# Patient Record
Sex: FEMALE | URBAN - METROPOLITAN AREA
[De-identification: names, ages, dates, MRNs, and addresses within clinical notes are randomized per-mention and may not be internally consistent; named-entity substitution may affect disease eponyms.]

---

## 2019-12-20 ENCOUNTER — APPOINTMENT (RX ONLY)
Dept: URBAN - METROPOLITAN AREA CLINIC 77 | Facility: CLINIC | Age: 44
Setting detail: DERMATOLOGY
End: 2019-12-20

## 2019-12-20 DIAGNOSIS — L98.8 OTHER SPECIFIED DISORDERS OF THE SKIN AND SUBCUTANEOUS TISSUE: ICD-10-CM

## 2019-12-20 PROBLEM — E86.9 VOLUME DEPLETION, UNSPECIFIED: Status: ACTIVE | Noted: 2019-12-20

## 2019-12-20 PROCEDURE — ? JUVEDERM ULTRA XC INJECTION

## 2019-12-20 PROCEDURE — ? SCULPTRA

## 2019-12-20 PROCEDURE — ? ADDITIONAL NOTES

## 2019-12-20 NOTE — PROCEDURE: SCULPTRA
Right Zygomatic Arche Filler Volume In Cc: 0
Show Right And Left Jawline Volume?: No
Show Forehead Volume?: Yes
Dilution Method: The Sculptra was diluted with 6ml of bacteriostatic sterile water and 3.0 mL of 1% Lidocaine with epinephrine 1:100,000 for a total volume of 9mL for each vial.
Expiration Date (Month Year): 10/31/2021, 06/30/22
Lot #: [id number] 
Injection Technique: The Sculptra was injected to the listed areas after cleansing the skin and providing appropriate anesthesia.
Additional Area 1 Volume In Cc: 9
Post-Care Instructions: Patient instructed to apply ice to reduce swelling.
Detail Level: Detailed
Price (Use Numbers Only, No Special Characters Or $): 1200.00
Additional Area 1 Location: right medial I/O, lateral I/O bilaterally, zygomaticomalar cheeks, submalar cheeks, MLFs, pillars
Reconstitution Date: 12/10/19
Topical Anesthesia?: 20% lidocaine
Consent: Written consent obtained. Risks include but not limited to bruising, beading, irregular texture, ulceration, infection, allergic reaction, scar formation, incomplete augmentation, temporary nature, procedural pain.
Map Statement: See Attached Map for Complete Details.

## 2019-12-20 NOTE — PROCEDURE: JUVEDERM ULTRA XC INJECTION
Additional Area 1 Location: vermillion body of central 2/3rd of upper lip bilaterally, central lower lip vermilion body, lower MLFs

## 2019-12-20 NOTE — PROCEDURE: ADDITIONAL NOTES
Detail Level: Detailed
Additional Notes: The patient’s right lower lip is naturally taveras than the left lower lip.
Additional Notes: The patient has a residual knot after a previous Sculptra treatment over the Right lateral I/O bone. It was elected to not proceed with Botox today since a Sculptra treatment was performed.

## 2019-12-20 NOTE — PROCEDURE: JUVEDERM ULTRA XC INJECTION
Post-Care Instructions: Patient instructed to apply ice to reduce swelling. Discussed signs and symptoms of vascular compromise. Patient was instructed to call the office immediately.

## 2019-12-23 ENCOUNTER — APPOINTMENT (RX ONLY)
Dept: URBAN - METROPOLITAN AREA CLINIC 77 | Facility: CLINIC | Age: 44
Setting detail: DERMATOLOGY
End: 2019-12-23

## 2019-12-23 DIAGNOSIS — D69.2 OTHER NONTHROMBOCYTOPENIC PURPURA: ICD-10-CM

## 2019-12-23 PROCEDURE — ? EXCEL V LASER

## 2019-12-23 ASSESSMENT — LOCATION ZONE DERM: LOCATION ZONE: FACE

## 2019-12-23 ASSESSMENT — LOCATION DETAILED DESCRIPTION DERM: LOCATION DETAILED: LEFT INFERIOR MEDIAL MALAR CHEEK

## 2019-12-23 ASSESSMENT — LOCATION SIMPLE DESCRIPTION DERM: LOCATION SIMPLE: LEFT CHEEK

## 2019-12-23 NOTE — PROCEDURE: EXCEL V LASER
Detail Level: Zone
Price (Use Numbers Only, No Special Characters Or $): 0
Pulse Width In Msec: 8
External Cooling: Tye Cryo 5
Procedural Text: Cold gel was applied to the treatment areas.  The treatment areas were treated as noted above.
Fluence In J: 6
Pre-Procedure Text: No numb time.
External Cooling Fan Speed: 5
Spot Size: 12
Consent: Written consent obtained, risks reviewed including but not limited to crusting, scabbing, blistering, scarring, darker or lighter pigmentary change, and/or incomplete removal.
Treatment Number (Will Not Render If 0): 1
Post-Procedure Text: Post-op instructions discussed with patient.
Laser Type: 1064nm

## 2020-01-31 ENCOUNTER — APPOINTMENT (RX ONLY)
Dept: URBAN - METROPOLITAN AREA CLINIC 77 | Facility: CLINIC | Age: 45
Setting detail: DERMATOLOGY
End: 2020-01-31

## 2020-01-31 DIAGNOSIS — L98.8 OTHER SPECIFIED DISORDERS OF THE SKIN AND SUBCUTANEOUS TISSUE: ICD-10-CM

## 2020-01-31 PROCEDURE — ? COUNSELING

## 2020-01-31 PROCEDURE — ? BOTOX

## 2020-01-31 NOTE — PROCEDURE: BOTOX
Show Lcl Units: No
Show Additional Area 3: Yes
[facility] Units: 0
Consent: Written consent obtained. Risks include but not limited to lid/brow ptosis, bruising, swelling, diplopia, temporary effect, incomplete chemical denervation.
Additional Area 3 Units: 8
Additional Area 2 Location: upper lip
Additional Area 2 Units: 4.5
Additional Area 1 Units: 6
Detail Level: Detailed
Periorbital Skin Units: 10
Forehead Units: 1.5
Additional Area 1 Location: tails
Lot #: [id number]
Post-Care Instructions: Patient instructed to not rub or massage areas injected, not to lie down for 4 hours and to limit physical activity for 24 hours. Patient instructed not to travel by airplane for 48 hours. Patient instructed to animate the muscle groups that were injected today every 5-6 minutes for the next 3-4 hours.
Price (Use Numbers Only, No Special Characters Or $): 957
Additional Area 3 Location: lateral corrugators
Glabellar Complex Units: 9
Expiration Date (Month Year): 08/2022

## 2020-03-17 ENCOUNTER — RX ONLY (OUTPATIENT)
Age: 45
Setting detail: RX ONLY
End: 2020-03-17

## 2020-03-17 ENCOUNTER — APPOINTMENT (RX ONLY)
Dept: URBAN - METROPOLITAN AREA CLINIC 77 | Facility: CLINIC | Age: 45
Setting detail: DERMATOLOGY
End: 2020-03-17

## 2020-03-17 PROBLEM — E86.9 VOLUME DEPLETION, UNSPECIFIED: Status: ACTIVE | Noted: 2020-03-17

## 2020-03-17 PROCEDURE — ? SCULPTRA

## 2020-03-17 NOTE — PROCEDURE: SCULPTRA
Consent: Written consent obtained. Risks include but not limited to bruising, beading, irregular texture, ulceration, infection, allergic reaction, scar formation, incomplete augmentation, temporary nature, procedural pain.
Right Lateral Face Filler Volume In Cc: 0
Show Right And Left Pa Volume?: No
Use Map Statement For Sites (Optional): Yes
Additional Area 1 Volume In Cc: 9
Additional Area 1 Location: lateral IO cheeks, zygomatica malar cheeks, sub malar cheeks, pyriform triangles, right pillar
Post-Care Instructions: Patient instructed to apply ice to reduce swelling.
Dilution Method: The Sculptra was diluted with 6ml of bacteriostatic sterile water and 3.0 mL of 1% Lidocaine with epinephrine for a total volume of 9mL for each vial.
Detail Level: Detailed
Price (Use Numbers Only, No Special Characters Or $): 1200
Vials Reconstituted (Required For Inventory): 1
Volumizer: Sculptra
Reconstitution Date: 3-17-20
Lot #: [id number]
Expiration Date (Month Year): 10/31/2022
Injection Technique: The Sculptra was injected to the listed areas after cleansing the skin and providing appropriate anesthesia.
Treatment Number: 2
Map Statement: See Attached Map for Complete Details.

## 2020-04-08 ENCOUNTER — APPOINTMENT (RX ONLY)
Dept: URBAN - METROPOLITAN AREA CLINIC 77 | Facility: CLINIC | Age: 45
Setting detail: DERMATOLOGY
End: 2020-04-08

## 2020-04-08 DIAGNOSIS — L50.8 OTHER URTICARIA: ICD-10-CM

## 2020-04-08 PROBLEM — L50.1 IDIOPATHIC URTICARIA: Status: ACTIVE | Noted: 2020-04-08

## 2020-04-08 PROCEDURE — ? COUNSELING

## 2020-04-08 PROCEDURE — 99213 OFFICE O/P EST LOW 20 MIN: CPT

## 2020-04-08 PROCEDURE — ? PRESCRIPTION

## 2020-04-08 PROCEDURE — ? TREATMENT REGIMEN

## 2020-04-08 ASSESSMENT — LOCATION DETAILED DESCRIPTION DERM
LOCATION DETAILED: UPPER STERNUM
LOCATION DETAILED: LEFT ANTERIOR PROXIMAL UPPER ARM
LOCATION DETAILED: RIGHT ANTERIOR DISTAL UPPER ARM

## 2020-04-08 ASSESSMENT — LOCATION SIMPLE DESCRIPTION DERM
LOCATION SIMPLE: RIGHT UPPER ARM
LOCATION SIMPLE: CHEST
LOCATION SIMPLE: LEFT UPPER ARM

## 2020-04-08 ASSESSMENT — LOCATION ZONE DERM
LOCATION ZONE: TRUNK
LOCATION ZONE: ARM

## 2020-04-08 NOTE — PROCEDURE: TREATMENT REGIMEN
Detail Level: Zone
Initiate Treatment: Zyrtec once daily May increase to twice daily. Prednisone taper.
Continue Regimen: Benadryl at night.

## 2020-04-08 NOTE — HPI: HIVES (URTICARIA)
Is This A New Presentation, Or A Follow-Up?: Hives
Additional History: Patient have not started any new medication or personal products. She denies of shortness of breath no cough, itchy eyes or runny nose. She is using clobetasol cream to affected area and cold compresses.

## 2020-06-02 ENCOUNTER — APPOINTMENT (RX ONLY)
Dept: URBAN - METROPOLITAN AREA CLINIC 77 | Facility: CLINIC | Age: 45
Setting detail: DERMATOLOGY
End: 2020-06-02

## 2020-06-02 PROBLEM — E86.9 VOLUME DEPLETION, UNSPECIFIED: Status: ACTIVE | Noted: 2020-06-02

## 2020-06-02 PROCEDURE — ? SCULPTRA

## 2020-06-02 NOTE — PROCEDURE: SCULPTRA
Right Jawline Filler Volume In Cc: 0
Post-Care Instructions: Patient instructed to apply ice to reduce swelling.
Map Statement: See Attached Map for Complete Details.
Show Right And Left Middle Malar Volume?: No
Show Chin Volume?: Yes
Topical Anesthesia?: 20% lidocaine
Detail Level: Detailed
Reconstitution Date: 5-26-20 & 6-1-20
Additional Area 1 Volume In Cc: 6
Price (Use Numbers Only, No Special Characters Or $): 900
Dilution Method: The Sculptra was diluted with 6ml of bacteriostatic sterile water and 3.0 mL of 1% Lidocaine with epinephrine for a total volume of 9mL for each vial.
Treatment Number: 3
Lot #: [id number]
Injection Technique: The Sculptra was injected to the listed areas after cleansing the skin and providing appropriate anesthesia.
Vials Reconstituted (Required For Inventory): 1
Additional Area 1 Location: right mlf, right lateral infraorbital rim, left zygomatic cheek/ zm arch, left submalar cheek, left upper mlf, left io rim
Expiration Date (Month Year): 8/31/22 & 10/31/2022
Consent: Written consent obtained. Risks include but not limited to bruising, beading, irregular texture, ulceration, infection, allergic reaction, scar formation, incomplete augmentation, temporary nature, procedural pain.
Volumizer: Sculptra

## 2020-06-30 ENCOUNTER — APPOINTMENT (RX ONLY)
Dept: URBAN - METROPOLITAN AREA CLINIC 77 | Facility: CLINIC | Age: 45
Setting detail: DERMATOLOGY
End: 2020-06-30

## 2020-06-30 DIAGNOSIS — L98.8 OTHER SPECIFIED DISORDERS OF THE SKIN AND SUBCUTANEOUS TISSUE: ICD-10-CM

## 2020-06-30 PROCEDURE — ? BOTOX

## 2020-06-30 PROCEDURE — ? COUNSELING

## 2020-06-30 NOTE — PROCEDURE: BOTOX
Show Additional Area 3: Yes
[facility] Units: 0
Price (Use Numbers Only, No Special Characters Or $): 779
Consent: Written consent obtained. Risks include but not limited to lid/brow ptosis, bruising, swelling, diplopia, temporary effect, incomplete chemical denervation.
Show Lcl Units: No
Reconstitution Date (Optional): 6-25-20
Additional Area 2 Location: upper lip
Additional Area 1 Units: 6
Additional Area 2 Units: 4.5
Periorbital Skin Units: 10
Detail Level: Detailed
Additional Area 3 Units: 8
Lot #: [id number]
Post-Care Instructions: Patient instructed to not rub or massage areas injected, not to lie down for 4 hours and to limit physical activity for 24 hours. Patient instructed not to travel by airplane for 48 hours. Patient instructed to animate the muscle groups that were injected today every 5-6 minutes for the next 3-4 hours.
Additional Area 3 Location: lateral corrugators
Forehead Units: 1.5
Glabellar Complex Units: 9
Additional Area 1 Location: tails
Expiration Date (Month Year): 01/31/2023

## 2020-09-04 ENCOUNTER — APPOINTMENT (RX ONLY)
Dept: URBAN - METROPOLITAN AREA CLINIC 77 | Facility: CLINIC | Age: 45
Setting detail: DERMATOLOGY
End: 2020-09-04

## 2020-09-04 DIAGNOSIS — L68.0 HIRSUTISM: ICD-10-CM

## 2020-09-04 DIAGNOSIS — L98.8 OTHER SPECIFIED DISORDERS OF THE SKIN AND SUBCUTANEOUS TISSUE: ICD-10-CM

## 2020-09-04 PROCEDURE — ? JUVEDERM VOLUMA XC INJECTION

## 2020-09-04 PROCEDURE — ? BOTOX

## 2020-09-04 PROCEDURE — 99211 OFF/OP EST MAY X REQ PHY/QHP: CPT

## 2020-09-04 PROCEDURE — ? MEDICAL CONSULTATION: LHR

## 2020-09-04 NOTE — PROCEDURE: BOTOX
Left Periorbital Skin Units: 0
Show Additional Area 1: Yes
Dilution (U/0.1 Cc): 4
Lot #: [id number]
Show Lcl Units: No
Additional Area 1 Location: upper lip
Post-Care Instructions: Patient instructed to not lie down for 4 hours and limit physical activity for 24 hours.
Forehead Units: 1.5
Consent: Written consent obtained. Risks include but not limited to lid/brow ptosis, bruising, swelling, diplopia, temporary effect, incomplete chemical denervation.
Detail Level: Detailed
Additional Area 1 Units: 4.5
Price (Use Numbers Only, No Special Characters Or $): 96.00
Expiration Date (Month Year): 4/2023
Reconstitution Date (Optional): 9/3/20

## 2020-09-04 NOTE — PROCEDURE: JUVEDERM VOLUMA XC INJECTION
Detail Level: Detailed
Additional Area 5 Volume In Cc: 0
Filler: Juvederm Voluma XC
Include Cannula Information In Note?: No
Consent: Written consent obtained. Risks include but not limited to bruising, beading, irregular texture, ulceration, infection, allergic reaction, scar formation, incomplete augmentation, temporary nature, procedural pain.
Additional Area 1 Volume In Cc: 2
Procedural Text: The filler was administered to the treatment areas noted above.
Price (Use Numbers Only, No Special Characters Or $): 2804.
Map Statment: See Attach Map for Complete Details
Topical Anesthesia?: 20% lidocaine
Additional Anesthesia Volume In Cc: 6
Expiration Date (Month Year): 06/24/2021 & 05/10/2021
Post-Care Instructions: Patient instructed to apply ice to reduce swelling.
Lot #: [id number]

## 2020-09-14 ENCOUNTER — RX ONLY (OUTPATIENT)
Age: 45
Setting detail: RX ONLY
End: 2020-09-14

## 2020-09-14 RX ORDER — BIMATOPROST 0.3 MG/ML
SOLUTION/ DROPS OPHTHALMIC
Qty: 5 | Refills: 2 | Status: ERX | COMMUNITY
Start: 2020-09-14

## 2020-12-03 ENCOUNTER — RX ONLY (OUTPATIENT)
Age: 45
Setting detail: RX ONLY
End: 2020-12-03

## 2020-12-04 ENCOUNTER — APPOINTMENT (RX ONLY)
Dept: URBAN - METROPOLITAN AREA CLINIC 77 | Facility: CLINIC | Age: 45
Setting detail: DERMATOLOGY
End: 2020-12-04

## 2020-12-04 DIAGNOSIS — R23.8 OTHER SKIN CHANGES: ICD-10-CM

## 2020-12-04 PROCEDURE — ? ADDITIONAL NOTES

## 2020-12-04 PROCEDURE — ? JUVEDERM VOLUMA XC INJECTION

## 2020-12-04 NOTE — PROCEDURE: JUVEDERM VOLUMA XC INJECTION
[place] Filler Volume In Cc: 0
Procedural Text: The filler was administered to the treatment areas noted above.
Price (Use Numbers Only, No Special Characters Or $): 216.74
Additional Area 1 Location: zygomatic malar cheeks, left zygomatic angle
Consent: Written consent obtained. Risks include but not limited to bruising, beading, irregular texture, ulceration, infection, allergic reaction, scar formation, incomplete augmentation, temporary nature, procedural pain.
Topical Anesthesia?: 20% lidocaine
Expiration Date (Month Year): 5-
Lot #: [id number]
Include Cannula Information In Note?: No
Map Statment: See Attach Map for Complete Details
Number Of Syringes (Required For Inventory): 1
Filler: Juvederm Voluma XC
Post-Care Instructions: Patient instructed to apply ice to reduce swelling.
Detail Level: Detailed

## 2020-12-04 NOTE — PROCEDURE: ADDITIONAL NOTES
Additional Notes: Patient has asymmetry of the zymatic cheeks, noted that left side uneven and volume deficiency of the right side.
Detail Level: Simple

## 2020-12-18 ENCOUNTER — APPOINTMENT (RX ONLY)
Dept: URBAN - METROPOLITAN AREA CLINIC 77 | Facility: CLINIC | Age: 45
Setting detail: DERMATOLOGY
End: 2020-12-18

## 2020-12-18 DIAGNOSIS — L98.8 OTHER SPECIFIED DISORDERS OF THE SKIN AND SUBCUTANEOUS TISSUE: ICD-10-CM

## 2020-12-18 DIAGNOSIS — Z48.817 ENCOUNTER FOR SURGICAL AFTERCARE FOLLOWING SURGERY ON THE SKIN AND SUBCUTANEOUS TISSUE: ICD-10-CM

## 2020-12-18 DIAGNOSIS — R23.8 OTHER SKIN CHANGES: ICD-10-CM

## 2020-12-18 PROCEDURE — ? BOTOX

## 2020-12-18 PROCEDURE — ? PRESCRIPTION

## 2020-12-18 PROCEDURE — 99213 OFFICE O/P EST LOW 20 MIN: CPT

## 2020-12-18 PROCEDURE — ? TREATMENT REGIMEN

## 2020-12-18 PROCEDURE — ? JUVEDERM ULTRA PLUS XC INJECTION

## 2020-12-18 RX ORDER — GENTAMICIN SULFATE 1 MG/G
OINTMENT TOPICAL
Qty: 15 | Refills: 1 | Status: ERX | COMMUNITY
Start: 2020-12-18

## 2020-12-18 RX ORDER — DOXYCYCLINE 100 MG/1
1 CAPSULE ORAL BID
Qty: 14 | Refills: 0 | Status: ERX | COMMUNITY
Start: 2020-12-18

## 2020-12-18 RX ADMIN — DOXYCYCLINE 1: 100 CAPSULE ORAL at 00:00

## 2020-12-18 RX ADMIN — GENTAMICIN SULFATE: 1 OINTMENT TOPICAL at 00:00

## 2020-12-18 NOTE — PROCEDURE: BOTOX
Show Ucl Units: No
Show Anterior Platysmal Band Units: Yes
[facility] Units: 0
Periorbital Skin Units: 10
Additional Area 2 Units: 4.5
Detail Level: Detailed
Lot #: [id number]
Post-Care Instructions: Patient instructed to not rub or massage areas injected, not to lie down for 4 hours and to limit physical activity for 24 hours. Patient instructed not to travel by airplane for 48 hours. Patient instructed to animate the muscle groups that were injected today every 5-6 minutes for the next 3-4 hours.
Glabellar Complex Units: 9
Additional Area 3 Units: 8
Additional Area 1 Location: tails
Consent: Written consent obtained. Risks include but not limited to lid/brow ptosis, bruising, swelling, diplopia, temporary effect, incomplete chemical denervation.
Forehead Units: 1.5
Reconstitution Date (Optional): 12- & 12-
Price (Use Numbers Only, No Special Characters Or $): 412
Additional Area 2 Location: upper lip
Additional Area 3 Location: lateral corrugators
Expiration Date (Month Year): 8-2023 & 9-2023
Additional Area 1 Units: 6

## 2020-12-18 NOTE — PROCEDURE: TREATMENT REGIMEN
Initiate Treatment: 1. Doxycycline 100mg BID x 7 days\\n2. Gentamicin ointment BID - TID PRN
Detail Level: Detailed

## 2020-12-18 NOTE — PROCEDURE: JUVEDERM ULTRA PLUS XC INJECTION
Filler: Juvederm Ultra Plus XC
[place]orsal Hands Filler Volume In Cc: 0
Additional Area 1 Volume In Cc: 0.5
Include Cannula Brand?: DermaSculpt
Detail Level: Simple
Include Cannula Length?: 1.5 inch
Topical Anesthesia?: 20% lidocaine
Additional Area 1 Location: zygomatic cheeks (right greater than left)
Include Cannula Information In Note?: No
Map Statment: See Attach Map for Complete Details
Procedural Text: The filler was administered to the treatment areas noted above.
Consent: Written consent obtained. Risks include but not limited to bruising, beading, irregular texture, ulceration, infection, allergic reaction, scar formation, incomplete augmentation, temporary nature, procedural pain.
Expiration Date (Month Year): 7-
Lot #: [id number]
Price (Use Numbers Only, No Special Characters Or $): 770.81
Number Of Syringes (Required For Inventory): 1
Include Cannula Size?: 27G
Post-Care Instructions: Patient instructed to apply ice to reduce swelling.
Use Map Statement For Sites (Optional): Yes

## 2021-02-02 ENCOUNTER — RX ONLY (OUTPATIENT)
Age: 46
Setting detail: RX ONLY
End: 2021-02-02

## 2021-02-02 RX ORDER — BIMATOPROST 0.3 MG/ML
SOLUTION/ DROPS OPHTHALMIC
Qty: 5 | Refills: 11 | Status: ERX

## 2021-03-23 ENCOUNTER — APPOINTMENT (RX ONLY)
Dept: URBAN - METROPOLITAN AREA CLINIC 77 | Facility: CLINIC | Age: 46
Setting detail: DERMATOLOGY
End: 2021-03-23

## 2021-03-23 PROBLEM — E86.9 VOLUME DEPLETION, UNSPECIFIED: Status: ACTIVE | Noted: 2021-03-23

## 2021-03-23 PROCEDURE — ? SCULPTRA

## 2021-03-23 NOTE — PROCEDURE: SCULPTRA
Show Decollete Volume?: Yes
Show Right And Left Cheek Volume?: No
Forehead Sculptra Filler Volume In Cc: 0
Treatment Number: 4
Additional Area 1 Location: right mlf, left zygomatic cheek/ zm arch, left submalar cheek, left upper mlf, left io rim
Consent: Written consent obtained. Risks include but not limited to bruising, beading, irregular texture, ulceration, infection, allergic reaction, scar formation, incomplete augmentation, temporary nature, procedural pain.
Expiration Date (Month Year): 03/31/2023
Volumizer: Sculptra
Post-Care Instructions: Patient instructed to apply ice to reduce swelling.
Reconstitution Date: 03/15/2021
Lot #: [id number]
Map Statement: See Attached Map for Complete Details.
Dilution Method: The Sculptra was diluted with 6ml of bacteriostatic sterile water and 3.0 mL of 1% Lidocaine with epinephrine for a total volume of 9mL for each vial.
Price (Use Numbers Only, No Special Characters Or $): 1000.00
Additional Area 1 Volume In Cc: 7
Injection Technique: The Sculptra was injected to the listed areas after cleansing the skin and providing appropriate anesthesia.
Vials Reconstituted (Required For Inventory): 1
Detail Level: Detailed
Topical Anesthesia?: 20% lidocaine

## 2021-04-23 ENCOUNTER — APPOINTMENT (RX ONLY)
Dept: URBAN - METROPOLITAN AREA CLINIC 76 | Facility: CLINIC | Age: 46
Setting detail: DERMATOLOGY
End: 2021-04-23

## 2021-04-23 DIAGNOSIS — R23.8 OTHER SKIN CHANGES: ICD-10-CM

## 2021-04-23 DIAGNOSIS — L98.8 OTHER SPECIFIED DISORDERS OF THE SKIN AND SUBCUTANEOUS TISSUE: ICD-10-CM

## 2021-04-23 PROCEDURE — ? BOTOX

## 2021-04-23 PROCEDURE — ? JUVEDERM VOLUMA XC INJECTION

## 2021-04-23 NOTE — PROCEDURE: JUVEDERM VOLUMA XC INJECTION
Procedural Text: The filler was administered to the treatment areas noted above.
Dorsal Hands Filler Volume In Cc: 0
Topical Anesthesia?: 20% lidocaine
Post-Care Instructions: Patient instructed to apply ice to reduce swelling.
Include Cannula Information In Note?: No
Detail Level: Detailed
Additional Area 1 Location: zygomatic malar cheeks L>R, zygomatic arches
Anesthesia Volume In Cc: 0.5
Additional Area 1 Volume In Cc: 1
Filler: Juvederm Voluma XC
Map Statment: See Attach Map for Complete Details
Lot #: [id number]
Consent: Written consent obtained. Risks include but not limited to bruising, beading, irregular texture, ulceration, infection, allergic reaction, scar formation, incomplete augmentation, temporary nature, procedural pain.
Price (Use Numbers Only, No Special Characters Or $): 362.04
Expiration Date (Month Year): 5-
Additional Anesthesia Volume In Cc: 6

## 2021-04-23 NOTE — PROCEDURE: BOTOX
Additional Area 4 Units: 0
Show Glabellar Units: Yes
Additional Area 2 Location: upper lip
Additional Area 1 Units: 6
Detail Level: Detailed
Additional Area 2 Units: 4.5
Show Right And Left Pupillary Line Units: No
Periorbital Skin Units: 10
Post-Care Instructions: Patient instructed to not rub or massage areas injected, not to lie down for 4 hours and to limit physical activity for 24 hours. Patient instructed not to travel by airplane for 48 hours. Patient instructed to animate the muscle groups that were injected today every 5-6 minutes for the next 3-4 hours.
Additional Area 3 Units: 8
Glabellar Complex Units: 9
Expiration Date (Month Year): 06/2023
Lot #: [id number]
Additional Area 3 Location: lateral corrugators
Additional Area 1 Location: tails
Forehead Units: 1.5
Reconstitution Date (Optional): 4/23/2021
Price (Use Numbers Only, No Special Characters Or $): 111
Consent: Written consent obtained. Risks include but not limited to lid/brow ptosis, bruising, swelling, diplopia, temporary effect, incomplete chemical denervation.

## 2021-07-20 ENCOUNTER — APPOINTMENT (RX ONLY)
Dept: URBAN - METROPOLITAN AREA CLINIC 76 | Facility: CLINIC | Age: 46
Setting detail: DERMATOLOGY
End: 2021-07-20

## 2021-07-20 DIAGNOSIS — L98.8 OTHER SPECIFIED DISORDERS OF THE SKIN AND SUBCUTANEOUS TISSUE: ICD-10-CM

## 2021-07-20 PROCEDURE — ? BOTOX

## 2021-07-20 NOTE — PROCEDURE: BOTOX
Additional Area 2 Units: 4.5
Show Anterior Platysmal Band Units: Yes
Dilution (U/0.1 Cc): 10
Detail Level: Detailed
Periorbital Skin Units: 14
Left Pupillary Line Units: 0
Show Right And Left Pupillary Line Units: No
Lot #: [id number]
Additional Area 3 Units: 8
Post-Care Instructions: Patient instructed to not rub or massage areas injected, not to lie down for 4 hours and to limit physical activity for 24 hours. Patient instructed not to travel by airplane for 48 hours. Patient instructed to animate the muscle groups that were injected today every 5-6 minutes for the next 3-4 hours.
Additional Area 3 Location: lateral corrugators
Forehead Units: 1.5
Glabellar Complex Units: 9
Additional Area 1 Location: tails
Consent: Written consent obtained. Risks include but not limited to lid/brow ptosis, bruising, swelling, diplopia, temporary effect, incomplete chemical denervation.
Reconstitution Date (Optional): 7-13-21
Price (Use Numbers Only, No Special Characters Or $): 766
Expiration Date (Month Year): 06/2023
Additional Area 2 Location: upper lip
Additional Area 1 Units: 6

## 2021-10-07 ENCOUNTER — RX ONLY (OUTPATIENT)
Age: 46
Setting detail: RX ONLY
End: 2021-10-07

## 2022-03-07 ENCOUNTER — APPOINTMENT (RX ONLY)
Dept: URBAN - METROPOLITAN AREA CLINIC 76 | Facility: CLINIC | Age: 47
Setting detail: DERMATOLOGY
End: 2022-03-07

## 2022-03-07 DIAGNOSIS — L98.8 OTHER SPECIFIED DISORDERS OF THE SKIN AND SUBCUTANEOUS TISSUE: ICD-10-CM

## 2022-03-07 PROCEDURE — ? BOTOX

## 2022-03-07 PROCEDURE — ? INVENTORY

## 2022-03-07 NOTE — PROCEDURE: BOTOX
Consent: Written consent obtained. Risks include but not limited to lid/brow ptosis, bruising, swelling, diplopia, temporary effect, incomplete chemical denervation. Patient instructed to not lie down for 4 hours and limit physical activity for 24 hours
Show Periorbital Units: Yes
Periorbital Skin Units: 16
Additional Area 1 Units: 4
Additional Area 1 Location: lateral tails
L Brow Units: 0
Show Ucl Units: No
Additional Area 2 Location: perioral
Post-Care Instructions: Patient instructed to not lie down for 4 hours and limit physical activity for 24 hours.
Additional Area 2 Units: 5
Detail Level: Detailed
Glabellar Complex Units: 8
Show Inventory Tab: Hide
Forehead Units: 2

## 2022-04-08 ENCOUNTER — APPOINTMENT (RX ONLY)
Dept: URBAN - METROPOLITAN AREA CLINIC 76 | Facility: CLINIC | Age: 47
Setting detail: DERMATOLOGY
End: 2022-04-08

## 2022-04-08 DIAGNOSIS — R23.8 OTHER SKIN CHANGES: ICD-10-CM

## 2022-04-08 PROCEDURE — ? INVENTORY

## 2022-04-08 PROCEDURE — ? JUVEDERM VOLUMA XC INJECTION

## 2022-04-08 PROCEDURE — ? ADDITIONAL NOTES

## 2022-04-08 NOTE — PROCEDURE: ADDITIONAL NOTES
Additional Notes: Patient had Sculptra done in December at another clinic.  Patient has developed a nodule on the left lateral infraorbital bone.  After injecting the patient today with Voluma the nodule is no longer visible.  Advised patient to watch this and let us know if she notices it again and we can inject with a steroid in the future.
Detail Level: Simple
Render Risk Assessment In Note?: no

## 2022-04-08 NOTE — PROCEDURE: JUVEDERM VOLUMA XC INJECTION
Additional Area 1 Volume In Cc: 1
[place] Lines Filler Volume In Cc: 0
Include Cannula Information In Note?: No
Topical Anesthesia?: 20% lidocaine
Additional Anesthesia Volume In Cc: 6
Post-Care Instructions: Patient instructed to apply ice to reduce swelling.
Procedural Text: The filler was administered to the treatment areas noted above.
Anesthesia Volume In Cc: 0.5
Show Inventory Tab: Hide
Map Statment: See Attach Map for Complete Details
Filler: Juvederm Voluma XC
Anesthesia Type: 1% lidocaine with epinephrine
Detail Level: Detailed
Additional Area 1 Location: zygomatic malar cheeks
Consent: Written consent obtained. Risks include but not limited to bruising, beading, irregular texture, ulceration, infection, allergic reaction, scar formation, incomplete augmentation, temporary nature, procedural pain.

## 2022-05-31 ENCOUNTER — RX ONLY (OUTPATIENT)
Age: 47
Setting detail: RX ONLY
End: 2022-05-31

## 2022-05-31 RX ORDER — BIMATOPROST 0.3 MG/ML
SOLUTION/ DROPS OPHTHALMIC
Qty: 5 | Refills: 6 | Status: ERX

## 2022-06-06 ENCOUNTER — APPOINTMENT (RX ONLY)
Dept: URBAN - METROPOLITAN AREA CLINIC 76 | Facility: CLINIC | Age: 47
Setting detail: DERMATOLOGY
End: 2022-06-06

## 2022-06-06 DIAGNOSIS — L98.8 OTHER SPECIFIED DISORDERS OF THE SKIN AND SUBCUTANEOUS TISSUE: ICD-10-CM

## 2022-06-06 DIAGNOSIS — D492 NEOPLASM OF UNSPECIFIED NATURE OF BONE, SOFT TISSUE, AND SKIN: ICD-10-CM

## 2022-06-06 PROBLEM — R22.9 LOCALIZED SWELLING, MASS AND LUMP, UNSPECIFIED: Status: ACTIVE | Noted: 2022-06-06

## 2022-06-06 PROCEDURE — ? ADDITIONAL NOTES

## 2022-06-06 PROCEDURE — ? INVENTORY

## 2022-06-06 PROCEDURE — ? BOTOX

## 2022-06-06 NOTE — PROCEDURE: BOTOX
Consent: Written consent obtained. Risks include but not limited to lid/brow ptosis, bruising, swelling, diplopia, temporary effect, incomplete chemical denervation. Patient instructed to not lie down for 4 hours and limit physical activity for 24 hours
Show Periorbital Units: Yes
Additional Area 1 Units: 6
Additional Area 1 Location: lateral cor
L Brow Units: 0
Show Ucl Units: No
Additional Area 2 Location: melissa
Post-Care Instructions: Patient instructed to not lie down for 4 hours and limit physical activity for 24 hours.
Additional Area 2 Units: 16
Detail Level: Detailed
Glabellar Complex Units: 9
Additional Area 3 Location: upper lip
Dilution (U/0.1 Cc): 10
Reconstitution Date (Optional): 06-06-22
Additional Area 3 Units: 5
Show Inventory Tab: Hide
Forehead Units: 2

## 2022-06-06 NOTE — PROCEDURE: ADDITIONAL NOTES
Detail Level: Simple
Additional Notes: Patient had sculptra at outside facility in December 2021 and saw [facility] for persistent nodule in April. 1cc of Voluma was injected at [facility]. Patient returns with persistent nodule left cheek.  Will attempt drainage next visit since Botox was placed today
Render Risk Assessment In Note?: no

## 2022-06-20 ENCOUNTER — APPOINTMENT (RX ONLY)
Dept: URBAN - METROPOLITAN AREA CLINIC 76 | Facility: CLINIC | Age: 47
Setting detail: DERMATOLOGY
End: 2022-06-20

## 2022-06-20 DIAGNOSIS — D492 NEOPLASM OF UNSPECIFIED NATURE OF BONE, SOFT TISSUE, AND SKIN: ICD-10-CM

## 2022-06-20 PROBLEM — R22.9 LOCALIZED SWELLING, MASS AND LUMP, UNSPECIFIED: Status: ACTIVE | Noted: 2022-06-20

## 2022-06-20 PROCEDURE — ? INJECTION

## 2022-06-20 PROCEDURE — ? ADDITIONAL NOTES

## 2022-06-20 PROCEDURE — 11900 INJECT SKIN LESIONS </W 7: CPT

## 2022-06-20 ASSESSMENT — LOCATION ZONE DERM: LOCATION ZONE: FACE

## 2022-06-20 ASSESSMENT — LOCATION DETAILED DESCRIPTION DERM: LOCATION DETAILED: LEFT SUPERIOR LATERAL MALAR CHEEK

## 2022-06-20 ASSESSMENT — LOCATION SIMPLE DESCRIPTION DERM: LOCATION SIMPLE: LEFT CHEEK

## 2022-06-20 NOTE — PROCEDURE: INJECTION
Dose Administered (Numbers Only - Mg, G, Mcg, Units, Cc): 0.15ml
Administered By (Optional): Vanessa Padilla
Consent: The risks of the medication were reviewed with the patient.
Medication (1) And Associated J-Code Units: Celestone, 3mg
Hide Second Medication?: No
Dose Administered (Numbers Only - Mg, G, Mcg, Units, Cc): 0
Treatment Number: 1
Expiration Date (Optional): 
Procedure Information: Please note that the numeric value listed in the Medication (1) and associated J-code units and Medication (2) and associated J-code units variables are j-code amounts and do not represent either the concentration or the total amount of the medications injected.  I strongly recommend selecting no to the Render J-code information in note question. This will allow your note to be more clear. If you are billing j-codes with your injection codes you need to document the total amount of the medication injected. This amount should match the j-code units. For example, if you are injecting Triamcinolone 40mg as an intramuscular injection you would select 40 for the dose field and mg for the units. This would allow you to document  with 4 units (40mg = 10mg x 4). The total volume is not used to calculate j-codes only the amount of the medication administered.
Total Volume Injected In Cc (Will Not Affected Billing): 0.15
Post-Care Instructions: I reviewed with the patient in detail post-care instructions. Patient understands to keep the injection sites clean and call the clinic if there is any redness, swelling or pain.
Render J-Code Information In Note?: yes
Ndc #: [id number]
Detail Level: None
Lot # (Optional): [id number]
Route: [place]

## 2022-06-20 NOTE — PROCEDURE: ADDITIONAL NOTES
Detail Level: Simple
Additional Notes: Patient had sculptra at outside facility in December 2021 and saw [facility] for persistent nodule in April. 1cc of Voluma was injected at [facility]. Patient returns with persistent nodule left cheek. A  22g needle was inserted in each nodule to breakup granulomatous material in a sweeping fashion,and steroid injection was given today.
Render Risk Assessment In Note?: no

## 2022-07-22 ENCOUNTER — APPOINTMENT (RX ONLY)
Dept: URBAN - METROPOLITAN AREA CLINIC 76 | Facility: CLINIC | Age: 47
Setting detail: DERMATOLOGY
End: 2022-07-22

## 2022-07-22 DIAGNOSIS — D492 NEOPLASM OF UNSPECIFIED NATURE OF BONE, SOFT TISSUE, AND SKIN: ICD-10-CM

## 2022-07-22 PROBLEM — R22.9 LOCALIZED SWELLING, MASS AND LUMP, UNSPECIFIED: Status: ACTIVE | Noted: 2022-07-22

## 2022-07-22 PROCEDURE — 11900 INJECT SKIN LESIONS </W 7: CPT

## 2022-07-22 PROCEDURE — ? ADDITIONAL NOTES

## 2022-07-22 PROCEDURE — ? INJECTION

## 2022-07-22 ASSESSMENT — LOCATION SIMPLE DESCRIPTION DERM: LOCATION SIMPLE: LEFT CHEEK

## 2022-07-22 ASSESSMENT — LOCATION ZONE DERM: LOCATION ZONE: FACE

## 2022-07-22 ASSESSMENT — LOCATION DETAILED DESCRIPTION DERM: LOCATION DETAILED: LEFT SUPERIOR LATERAL MALAR CHEEK

## 2022-07-22 NOTE — PROCEDURE: ADDITIONAL NOTES
Detail Level: Simple
Additional Notes: Patient had sculptra at outside facility in December 2021 and saw [facility] for persistent nodule in April. 1cc of Voluma was injected at [facility]. Patient returns with persistent nodule left cheek. A  22g needle was inserted in each nodule to breakup granulomatous material in a sweeping fashion,and steroid injection was given today.
Render Risk Assessment In Note?: no

## 2022-07-22 NOTE — PROCEDURE: INJECTION
Dose Administered (Numbers Only - Mg, G, Mcg, Units, Cc): 0.2ml
Administered By (Optional): Vanessa Padilla
Consent: The risks of the medication were reviewed with the patient.
Medication (1) And Associated J-Code Units: Celestone, 3mg
Hide Second Medication?: No
Dose Administered (Numbers Only - Mg, G, Mcg, Units, Cc): 0
Treatment Number: 2
Expiration Date (Optional): 
Procedure Information: Please note that the numeric value listed in the Medication (1) and associated J-code units and Medication (2) and associated J-code units variables are j-code amounts and do not represent either the concentration or the total amount of the medications injected.  I strongly recommend selecting no to the Render J-code information in note question. This will allow your note to be more clear. If you are billing j-codes with your injection codes you need to document the total amount of the medication injected. This amount should match the j-code units. For example, if you are injecting Triamcinolone 40mg as an intramuscular injection you would select 40 for the dose field and mg for the units. This would allow you to document  with 4 units (40mg = 10mg x 4). The total volume is not used to calculate j-codes only the amount of the medication administered.
Total Volume Injected In Cc (Will Not Affected Billing): 0.2
Post-Care Instructions: I reviewed with the patient in detail post-care instructions. Patient understands to keep the injection sites clean and call the clinic if there is any redness, swelling or pain.
Render J-Code Information In Note?: yes
Ndc #: [id number]
Detail Level: None
Lot # (Optional): [id number]
Route: IL

## 2022-08-24 ENCOUNTER — RX ONLY (OUTPATIENT)
Age: 47
Setting detail: RX ONLY
End: 2022-08-24

## 2022-08-24 ENCOUNTER — APPOINTMENT (RX ONLY)
Dept: URBAN - METROPOLITAN AREA CLINIC 76 | Facility: CLINIC | Age: 47
Setting detail: DERMATOLOGY
End: 2022-08-24

## 2022-08-24 DIAGNOSIS — D492 NEOPLASM OF UNSPECIFIED NATURE OF BONE, SOFT TISSUE, AND SKIN: ICD-10-CM

## 2022-08-24 PROBLEM — R22.9 LOCALIZED SWELLING, MASS AND LUMP, UNSPECIFIED: Status: ACTIVE | Noted: 2022-08-24

## 2022-08-24 PROCEDURE — ? ADDITIONAL NOTES

## 2022-08-24 PROCEDURE — ? INJECTION

## 2022-08-24 PROCEDURE — 11900 INJECT SKIN LESIONS </W 7: CPT

## 2022-08-24 ASSESSMENT — LOCATION DETAILED DESCRIPTION DERM: LOCATION DETAILED: LEFT SUPERIOR LATERAL MALAR CHEEK

## 2022-08-24 ASSESSMENT — LOCATION ZONE DERM: LOCATION ZONE: FACE

## 2022-08-24 ASSESSMENT — LOCATION SIMPLE DESCRIPTION DERM: LOCATION SIMPLE: LEFT CHEEK

## 2022-08-24 NOTE — PROCEDURE: INJECTION
Dose Administered (Numbers Only - Mg, G, Mcg, Units, Cc): 0.2ml
Administered By (Optional): Vanessa Padilla
Consent: The risks of the medication were reviewed with the patient.
Medication (1) And Associated J-Code Units: Triamcinolone acetonide, 1mg
Hide Second Medication?: No
Dose Administered (Numbers Only - Mg, G, Mcg, Units, Cc): 0
Treatment Number: 3
Expiration Date (Optional): 
Procedure Information: Please note that the numeric value listed in the Medication (1) and associated J-code units and Medication (2) and associated J-code units variables are j-code amounts and do not represent either the concentration or the total amount of the medications injected.  I strongly recommend selecting no to the Render J-code information in note question. This will allow your note to be more clear. If you are billing j-codes with your injection codes you need to document the total amount of the medication injected. This amount should match the j-code units. For example, if you are injecting Triamcinolone 40mg as an intramuscular injection you would select 40 for the dose field and mg for the units. This would allow you to document  with 4 units (40mg = 10mg x 4). The total volume is not used to calculate j-codes only the amount of the medication administered.
Total Volume Injected In Cc (Will Not Affected Billing): 0.2
Post-Care Instructions: I reviewed with the patient in detail post-care instructions. Patient understands to keep the injection sites clean and call the clinic if there is any redness, swelling or pain.
Render J-Code Information In Note?: yes
Ndc #: [id number]
Detail Level: None
Lot # (Optional): [id number]
Route: IL
Additional Comments: ****ILK 2.5mg was administered today****

## 2022-08-24 NOTE — PROCEDURE: ADDITIONAL NOTES
Detail Level: Simple
Additional Notes: Patient had sculptra at outside facility in December 2021 and saw [facility] for persistent nodule in April. 1cc of Voluma was injected at [facility]. Patient returns with persistent nodule left cheek. A  22g needle was inserted in each nodule to breakup granulomatous material in a sweeping fashion,and steroid injection was given today.
Render Risk Assessment In Note?: no

## 2022-08-30 ENCOUNTER — APPOINTMENT (RX ONLY)
Dept: URBAN - METROPOLITAN AREA CLINIC 76 | Facility: CLINIC | Age: 47
Setting detail: DERMATOLOGY
End: 2022-08-30

## 2022-08-30 DIAGNOSIS — R23.8 OTHER SKIN CHANGES: ICD-10-CM

## 2022-08-30 PROCEDURE — ? SCULPTRA

## 2022-08-30 PROCEDURE — ? INVENTORY

## 2022-08-30 NOTE — PROCEDURE: SCULPTRA
Show Chin Volume?: Yes
Right Tear Trough Filler Volume In Cc: 0
Show Right And Left Pa Volume?: No
Additional Anesthesia Volume In Cc: 6
Map Statement: See Attached Map for Complete Details.
Topical Anesthesia?: 20% lidocaine
Additional Area 1 Location: I/O groves, right lateral infraorbital grove-not the left near the nodule, pyriform triangles
Additional Area 1 Volume In Cc: 9
Dilution Method: The Sculptra was diluted with 6mL of bacteriostatic water and 3mL of 1% Lidocaine with epinephrine 1:100,000 for a total volume of 9mL for each vial.
Volumizer: Sculptra
Detail Level: Detailed
Post-Care Instructions: Patient instructed to apply ice to reduce swelling.
Anesthesia Type: 1% lidocaine with epinephrine
Vials Reconstituted (Required For Inventory): 1
Injection Technique: The Sculptra was injected to the listed areas after cleansing the skin and providing appropriate anesthesia.
Reconstitution Date: 8/19/2022
Show Inventory Tab: Hide
Consent: Written consent obtained. Risks include but not limited to bruising, beading, irregular texture, ulceration, infection, allergic reaction, scar formation, incomplete augmentation, temporary nature, procedural pain.

## 2022-09-02 ENCOUNTER — APPOINTMENT (RX ONLY)
Dept: URBAN - METROPOLITAN AREA CLINIC 76 | Facility: CLINIC | Age: 47
Setting detail: DERMATOLOGY
End: 2022-09-02

## 2022-09-02 DIAGNOSIS — R23.3 SPONTANEOUS ECCHYMOSES: ICD-10-CM

## 2022-09-02 PROCEDURE — ? CYNERGY LASER

## 2022-09-02 PROCEDURE — ? INVENTORY

## 2022-09-02 PROCEDURE — ? COUNSELING

## 2022-09-02 ASSESSMENT — LOCATION DETAILED DESCRIPTION DERM: LOCATION DETAILED: LEFT CENTRAL MALAR CHEEK

## 2022-09-02 ASSESSMENT — LOCATION SIMPLE DESCRIPTION DERM: LOCATION SIMPLE: LEFT CHEEK

## 2022-09-02 ASSESSMENT — LOCATION ZONE DERM: LOCATION ZONE: FACE

## 2022-09-02 NOTE — PROCEDURE: CYNERGY LASER
Spot Size In Mm: 10
Additional Pdl Fluence In J/Cm2 (Optional): 4
Pulse Width (Ms): 0.3
External Cooling: Tye Cryo 5
Cooling: SmartCool
Rep Rate (Hz): single pulse
Pulse Width (Ms): 6
Pulse Group 1 - PDL 0.5ms/Nd:YAG 15ms
Treatment Number: 1
Consent: Written consent obtained, risks reviewed including but not limited to crusting, scabbing, blistering, scarring, darker or lighter pigmentary change, systemic reactions, ulceration, incidental hair removal, bruising, and/or incomplete removal.
Spot Size In Mm: 1.5
Price (Use Numbers Only, No Special Characters Or $): 0
Spot Size In Mm: 7
External Cooling Fan Speed: 3
Delay Setting: Short (100ms)
Detail Level: Zone
Post-Care Instructions: I reviewed with the patient in detail post-care instructions. Patient should avoid sunlight and wear sun protection.

## 2022-09-19 ENCOUNTER — APPOINTMENT (RX ONLY)
Dept: URBAN - METROPOLITAN AREA CLINIC 76 | Facility: CLINIC | Age: 47
Setting detail: DERMATOLOGY
End: 2022-09-19

## 2022-09-19 DIAGNOSIS — L98.8 OTHER SPECIFIED DISORDERS OF THE SKIN AND SUBCUTANEOUS TISSUE: ICD-10-CM

## 2022-09-19 PROCEDURE — ? INVENTORY

## 2022-09-19 PROCEDURE — ? BOTOX

## 2022-09-19 NOTE — PROCEDURE: BOTOX
Consent: Written consent obtained. Risks include but not limited to lid/brow ptosis, bruising, swelling, diplopia, temporary effect, incomplete chemical denervation. Patient instructed to not lie down for 4 hours and limit physical activity for 24 hours
Show Periorbital Units: Yes
Additional Area 1 Units: 6
Additional Area 1 Location: lateral cor
L Brow Units: 0
Show Ucl Units: No
Additional Area 2 Location: melissa
Post-Care Instructions: Patient instructed to not lie down for 4 hours and limit physical activity for 24 hours.
Additional Area 2 Units: 16
Detail Level: Detailed
Glabellar Complex Units: 9
Additional Area 3 Location: upper lip
Dilution (U/0.1 Cc): 10
Reconstitution Date (Optional): 9/19/22
Additional Area 3 Units: 5
Show Inventory Tab: Hide
Forehead Units: 2

## 2022-09-30 ENCOUNTER — APPOINTMENT (RX ONLY)
Dept: URBAN - METROPOLITAN AREA CLINIC 76 | Facility: CLINIC | Age: 47
Setting detail: DERMATOLOGY
End: 2022-09-30

## 2022-09-30 DIAGNOSIS — D492 NEOPLASM OF UNSPECIFIED NATURE OF BONE, SOFT TISSUE, AND SKIN: ICD-10-CM

## 2022-09-30 PROBLEM — R22.9 LOCALIZED SWELLING, MASS AND LUMP, UNSPECIFIED: Status: ACTIVE | Noted: 2022-09-30

## 2022-09-30 PROCEDURE — 11900 INJECT SKIN LESIONS </W 7: CPT

## 2022-09-30 PROCEDURE — ? INJECTION

## 2022-09-30 PROCEDURE — ? ADDITIONAL NOTES

## 2022-09-30 ASSESSMENT — LOCATION SIMPLE DESCRIPTION DERM: LOCATION SIMPLE: LEFT CHEEK

## 2022-09-30 ASSESSMENT — LOCATION ZONE DERM: LOCATION ZONE: FACE

## 2022-09-30 ASSESSMENT — LOCATION DETAILED DESCRIPTION DERM: LOCATION DETAILED: LEFT SUPERIOR LATERAL MALAR CHEEK

## 2022-09-30 NOTE — PROCEDURE: INJECTION
Dose Administered (Numbers Only - Mg, G, Mcg, Units, Cc): 0.1ml
Administered By (Optional): Vanessa Padilla
Consent: The risks of the medication were reviewed with the patient.
Medication (1) And Associated J-Code Units: Triamcinolone acetonide, 1mg
Hide Second Medication?: No
Dose Administered (Numbers Only - Mg, G, Mcg, Units, Cc): 0
Treatment Number: 4
Procedure Information: Please note that the numeric value listed in the Medication (1) and associated J-code units and Medication (2) and associated J-code units variables are j-code amounts and do not represent either the concentration or the total amount of the medications injected.  I strongly recommend selecting no to the Render J-code information in note question. This will allow your note to be more clear. If you are billing j-codes with your injection codes you need to document the total amount of the medication injected. This amount should match the j-code units. For example, if you are injecting Triamcinolone 40mg as an intramuscular injection you would select 40 for the dose field and mg for the units. This would allow you to document  with 4 units (40mg = 10mg x 4). The total volume is not used to calculate j-codes only the amount of the medication administered.
Total Volume Injected In Cc (Will Not Affected Billing): 0.2
Post-Care Instructions: I reviewed with the patient in detail post-care instructions. Patient understands to keep the injection sites clean and call the clinic if there is any redness, swelling or pain.
Render J-Code Information In Note?: yes
Detail Level: None
Route: IL
Additional Comments: ****ILK 2.5mg was administered today****

## 2022-09-30 NOTE — PROCEDURE: ADDITIONAL NOTES
Detail Level: Simple
Additional Notes: Patient had sculptra at outside facility in December 2021 and saw [facility] for persistent nodule in April. 1cc of Voluma was injected at [facility]. Patient returns with persistent nodule left cheek. A  22g needle was inserted in each nodule to breakup granulomatous material in a sweeping fashion,and steroid injection was given today.
Render Risk Assessment In Note?: no

## 2022-11-29 ENCOUNTER — APPOINTMENT (RX ONLY)
Dept: URBAN - METROPOLITAN AREA CLINIC 76 | Facility: CLINIC | Age: 47
Setting detail: DERMATOLOGY
End: 2022-11-29

## 2022-11-29 DIAGNOSIS — R23.8 OTHER SKIN CHANGES: ICD-10-CM

## 2022-11-29 DIAGNOSIS — D492 NEOPLASM OF UNSPECIFIED NATURE OF BONE, SOFT TISSUE, AND SKIN: ICD-10-CM

## 2022-11-29 PROBLEM — R22.9 LOCALIZED SWELLING, MASS AND LUMP, UNSPECIFIED: Status: ACTIVE | Noted: 2022-11-29

## 2022-11-29 PROCEDURE — ? BENIGN DESTRUCTION

## 2022-11-29 PROCEDURE — ? ADDITIONAL NOTES

## 2022-11-29 PROCEDURE — 17110 DESTRUCTION B9 LES UP TO 14: CPT

## 2022-11-29 PROCEDURE — ? INVENTORY

## 2022-11-29 PROCEDURE — ? JUVEDERM VOLUMA XC INJECTION

## 2022-11-29 ASSESSMENT — LOCATION ZONE DERM: LOCATION ZONE: FACE

## 2022-11-29 ASSESSMENT — LOCATION SIMPLE DESCRIPTION DERM: LOCATION SIMPLE: LEFT CHEEK

## 2022-11-29 ASSESSMENT — LOCATION DETAILED DESCRIPTION DERM: LOCATION DETAILED: LEFT SUPERIOR LATERAL MALAR CHEEK

## 2022-11-29 NOTE — PROCEDURE: ADDITIONAL NOTES
Detail Level: Simple
Additional Notes: Patient had sculptra at outside facility in December 2021 and seen for persistent nodule in April. Patient returns with persistent nodule left cheek. A  22g needle was inserted in each nodule to breakup granulomatous material in a sweeping fashion.
Render Risk Assessment In Note?: no
Additional Notes: Plan:\\nMechanical debridement with decimation to a sculptra nodule.  Submit under a [id number] for $121

## 2022-11-29 NOTE — PROCEDURE: BENIGN DESTRUCTION
Detail Level: Detailed
Treatment Number (Will Not Render If 0): 0
Post-Care Instructions: I reviewed with the patient in detail post-care instructions. Patient is to wear sunprotection, and avoid picking at any of the treated lesions. Pt may apply Vaseline to crusted or scabbing areas.
Add 52 Modifier (Optional): no
Medical Necessity Information: It is in your best interest to select a reason for this procedure from the list below. All of these items fulfill various CMS LCD requirements except the new and changing color options.
Consent: The patient's consent was obtained including but not limited to risks of crusting, scabbing, blistering, scarring, darker or lighter pigmentary change, recurrence, incomplete removal and infection.
Medical Necessity Clause: This procedure was medically necessary because the lesions that were treated were:

## 2022-11-29 NOTE — PROCEDURE: JUVEDERM VOLUMA XC INJECTION
Show Inventory Tab: Hide
Temple Hollows Filler Volume In Cc: 0
Additional Area 1 Volume In Cc: 1
Anesthesia Volume In Cc: 0.5
Procedural Text: The filler was administered to the treatment areas noted above.
Consent: Written consent obtained. Risks include but not limited to bruising, beading, irregular texture, ulceration, infection, allergic reaction, scar formation, incomplete augmentation, temporary nature, procedural pain.
Topical Anesthesia?: 20% lidocaine
Additional Area 1 Location: ZM cheeks, zygomas
Include Cannula Information In Note?: No
Post-Care Instructions: Patient instructed to apply ice to reduce swelling.
Map Statment: See Attach Map for Complete Details
Filler: Juvederm Voluma XC
Additional Anesthesia Volume In Cc: 6
Anesthesia Type: 1% lidocaine with epinephrine
Detail Level: Detailed

## 2022-12-02 ENCOUNTER — APPOINTMENT (RX ONLY)
Dept: URBAN - METROPOLITAN AREA CLINIC 76 | Facility: CLINIC | Age: 47
Setting detail: DERMATOLOGY
End: 2022-12-02

## 2022-12-02 DIAGNOSIS — R23.3 SPONTANEOUS ECCHYMOSES: ICD-10-CM

## 2022-12-02 PROCEDURE — ? VBEAM PERFECTA LASER

## 2022-12-02 NOTE — PROCEDURE: VBEAM PERFECTA LASER
Delay Time (Ms): 0
Device Serial Number (Optional): **NO CHARGE FOR TODAY**
Location: FACE
Number Of Pulses: 8
Spot Size: 3 mm
Is There A Fifth Setting?: no
Fluence (J/Cm2): 5
Post-Procedure: Following the procedure the post care instructions were reviewed with the patient.
Detail Level: Zone
Pre-Procedure: The treatment areas identified by the patient were cleansed and treatment was performed with the parameters mentioned above.
Spot Size: 10 mm
Pulse Duration: 6 ms
Consent: Written consent obtained.  Risks were reviewed including but not limited to crusting, scabbing, blistering, scarring, darker or lighter pigmentary change, bruising, and/or incomplete response.
Post-Care Instructions: I reviewed with the patient in detail post-care instructions.  Cool compresses or cold gel packs may be applied after treatment.  Exposure to the sun should be avoided and sun protection and sunscreen should be used.

## 2022-12-16 ENCOUNTER — APPOINTMENT (RX ONLY)
Dept: URBAN - METROPOLITAN AREA CLINIC 76 | Facility: CLINIC | Age: 47
Setting detail: DERMATOLOGY
End: 2022-12-16

## 2022-12-16 DIAGNOSIS — L98.8 OTHER SPECIFIED DISORDERS OF THE SKIN AND SUBCUTANEOUS TISSUE: ICD-10-CM

## 2022-12-16 PROCEDURE — ? BOTOX

## 2022-12-16 PROCEDURE — ? INVENTORY

## 2022-12-16 NOTE — PROCEDURE: BOTOX
Consent: Written consent obtained. Risks include but not limited to lid/brow ptosis, bruising, swelling, diplopia, temporary effect, incomplete chemical denervation. Patient instructed to not lie down for 4 hours and limit physical activity for 24 hours
Show Periorbital Units: Yes
Additional Area 1 Units: 6
Additional Area 1 Location: lateral cor
L Brow Units: 0
Show Ucl Units: No
Additional Area 2 Location: melissa
Post-Care Instructions: Patient instructed to not lie down for 4 hours and limit physical activity for 24 hours.
Additional Area 2 Units: 14
Detail Level: Detailed
Glabellar Complex Units: 9
Additional Area 3 Location: upper lip
Dilution (U/0.1 Cc): 10
Reconstitution Date (Optional): 12/16/2022
Additional Area 3 Units: 5
Additional Area 4 Location: tails
Show Inventory Tab: Hide
Forehead Units: 2

## 2023-01-17 ENCOUNTER — APPOINTMENT (RX ONLY)
Dept: URBAN - METROPOLITAN AREA CLINIC 76 | Facility: CLINIC | Age: 48
Setting detail: DERMATOLOGY
End: 2023-01-17

## 2023-01-17 DIAGNOSIS — R23.8 OTHER SKIN CHANGES: ICD-10-CM

## 2023-01-17 PROCEDURE — ? ADDITIONAL NOTES

## 2023-01-17 PROCEDURE — ? JUVEDERM VOLUMA XC INJECTION

## 2023-01-17 PROCEDURE — ? INVENTORY

## 2023-01-17 NOTE — PROCEDURE: ADDITIONAL NOTES
Render Risk Assessment In Note?: no
Additional Notes: Per Dr. Simental charge for Voluma today is for $800.00 each syringe which equals 1600.00
Detail Level: Simple

## 2023-01-17 NOTE — PROCEDURE: JUVEDERM VOLUMA XC INJECTION
Show Inventory Tab: Hide
Temple Hollows Filler Volume In Cc: 0
Additional Area 1 Volume In Cc: 2
Anesthesia Volume In Cc: 0.5
Procedural Text: The filler was administered to the treatment areas noted above.
Consent: Written consent obtained. Risks include but not limited to bruising, beading, irregular texture, ulceration, infection, allergic reaction, scar formation, incomplete augmentation, temporary nature, procedural pain.
Topical Anesthesia?: 20% lidocaine
Additional Area 1 Location: ZM cheeks, zygomas
Include Cannula Information In Note?: No
Post-Care Instructions: Patient instructed to apply ice to reduce swelling.
Map Statment: See Attach Map for Complete Details
Filler: Juvederm Voluma XC
Additional Anesthesia Volume In Cc: 6
Number Of Syringes (Required For Inventory): 1
Anesthesia Type: 1% lidocaine with epinephrine
Detail Level: Detailed

## 2023-04-20 ENCOUNTER — APPOINTMENT (RX ONLY)
Dept: URBAN - METROPOLITAN AREA CLINIC 76 | Facility: CLINIC | Age: 48
Setting detail: DERMATOLOGY
End: 2023-04-20

## 2023-04-20 DIAGNOSIS — L98.8 OTHER SPECIFIED DISORDERS OF THE SKIN AND SUBCUTANEOUS TISSUE: ICD-10-CM

## 2023-04-20 DIAGNOSIS — H02.40 UNSPECIFIED PTOSIS OF EYELID: ICD-10-CM

## 2023-04-20 PROBLEM — H02.409 UNSPECIFIED PTOSIS OF UNSPECIFIED EYELID: Status: ACTIVE | Noted: 2023-04-20

## 2023-04-20 PROCEDURE — ? INVENTORY

## 2023-04-20 PROCEDURE — ? BOTOX

## 2023-04-20 PROCEDURE — ? PRESCRIPTION

## 2023-04-20 RX ORDER — OXYMETAZOLINE HYDROCHLORIDE OPHTHALMIC 1 MG/ML
SOLUTION/ DROPS OPHTHALMIC
Qty: 30 | Refills: 11 | Status: ERX | COMMUNITY
Start: 2023-04-20

## 2023-04-20 RX ADMIN — OXYMETAZOLINE HYDROCHLORIDE OPHTHALMIC: 1 SOLUTION/ DROPS OPHTHALMIC at 00:00

## 2023-06-21 ENCOUNTER — RX ONLY (OUTPATIENT)
Age: 48
Setting detail: RX ONLY
End: 2023-06-21

## 2023-06-27 ENCOUNTER — APPOINTMENT (RX ONLY)
Dept: URBAN - METROPOLITAN AREA CLINIC 76 | Facility: CLINIC | Age: 48
Setting detail: DERMATOLOGY
End: 2023-06-27

## 2023-06-27 DIAGNOSIS — R23.8 OTHER SKIN CHANGES: ICD-10-CM

## 2023-06-27 PROCEDURE — ? SCULPTRA

## 2023-06-27 PROCEDURE — ? INVENTORY

## 2023-06-27 NOTE — PROCEDURE: SCULPTRA
Show Chin Volume?: Yes
Right Tear Trough Filler Volume In Cc: 0
Show Right And Left Pa Volume?: No
Additional Anesthesia Volume In Cc: 6
Map Statement: See Attached Map for Complete Details.
Topical Anesthesia?: 20% lidocaine
Additional Area 1 Location: I/O, ZM and malar cheeks, pillars
Additional Area 1 Volume In Cc: 9.3
Dilution Method: The Sculptra was diluted with 6mL of bacteriostatic water and 3mL of 1% Lidocaine with epinephrine 1:100,000 for a total volume of 9mL for each vial.
Volumizer: Sculptra
Detail Level: Detailed
Post-Care Instructions: Patient instructed to apply ice to reduce swelling.
Anesthesia Type: 1% lidocaine with epinephrine
Vials Reconstituted (Required For Inventory): 1
Injection Technique: The Sculptra was injected to the listed areas after cleansing the skin and providing appropriate anesthesia.
Reconstitution Date: 8/19/2022
Show Inventory Tab: Hide
Consent: Written consent obtained. Risks include but not limited to bruising, beading, irregular texture, ulceration, infection, allergic reaction, scar formation, incomplete augmentation, temporary nature, procedural pain.

## 2023-06-29 ENCOUNTER — APPOINTMENT (RX ONLY)
Dept: URBAN - METROPOLITAN AREA CLINIC 76 | Facility: CLINIC | Age: 48
Setting detail: DERMATOLOGY
End: 2023-06-29

## 2023-06-29 DIAGNOSIS — R23.3 SPONTANEOUS ECCHYMOSES: ICD-10-CM

## 2023-06-29 PROCEDURE — ? VBEAM PERFECTA LASER

## 2023-06-29 NOTE — PROCEDURE: VBEAM PERFECTA LASER
Spray Time (Ms): 0
Is There A Second Setting?: no
Number Of Pulses: 22
Detail Level: Zone
Location: FACE
Post-Procedure: Following the procedure the post care instructions were reviewed with the patient.
Consent: Written consent obtained.  Risks were reviewed including but not limited to crusting, scabbing, blistering, scarring, darker or lighter pigmentary change, bruising, and/or incomplete response.
Post-Care Instructions: I reviewed with the patient in detail post-care instructions.  Cool compresses or cold gel packs may be applied after treatment.  Exposure to the sun should be avoided and sun protection and sunscreen should be used.
Fluence (J/Cm2): 5
Pulse Duration: 6 ms
Device Serial Number (Optional): **NO CHARGE FOR TODAY**
Spot Size: 3 mm
Spot Size: 10 mm
Pre-Procedure: The treatment areas identified by the patient were cleansed and treatment was performed with the parameters mentioned above.\\n\\nAppropriate eye protection worn by all persons in the room. \\n\\nZimmer chiller used during procedure.

## 2023-08-28 ENCOUNTER — APPOINTMENT (RX ONLY)
Dept: URBAN - METROPOLITAN AREA CLINIC 76 | Facility: CLINIC | Age: 48
Setting detail: DERMATOLOGY
End: 2023-08-28

## 2023-08-28 DIAGNOSIS — L98.8 OTHER SPECIFIED DISORDERS OF THE SKIN AND SUBCUTANEOUS TISSUE: ICD-10-CM

## 2023-08-28 DIAGNOSIS — R23.8 OTHER SKIN CHANGES: ICD-10-CM

## 2023-08-28 PROCEDURE — ? BOTOX

## 2023-08-28 PROCEDURE — ? JUVEDERM VOLUMA XC INJECTION

## 2023-08-28 PROCEDURE — ? INVENTORY

## 2023-08-28 NOTE — PROCEDURE: BOTOX
Consent: Written consent obtained. Risks include but not limited to lid/brow ptosis, bruising, swelling, diplopia, temporary effect, incomplete chemical denervation. Patient instructed to not lie down for 4 hours and limit physical activity for 24 hours
Show Periorbital Units: Yes
Additional Area 1 Units: 6
Additional Area 1 Location: lateral cor
L Brow Units: 0
Show Ucl Units: No
Additional Area 2 Location: melissa
Post-Care Instructions: Patient instructed to not lie down for 4 hours and limit physical activity for 24 hours.
Additional Area 2 Units: 14
Detail Level: Detailed
Glabellar Complex Units: 9
Additional Area 3 Location: upper lip
Dilution (U/0.1 Cc): 10
Additional Area 3 Units: 5
Additional Area 4 Location: tails
Show Inventory Tab: Hide
Forehead Units: 2
Incrementing Botox Units: By 0.5 Units

## 2024-01-04 ENCOUNTER — APPOINTMENT (RX ONLY)
Dept: URBAN - METROPOLITAN AREA CLINIC 76 | Facility: CLINIC | Age: 49
Setting detail: DERMATOLOGY
End: 2024-01-04

## 2024-01-04 DIAGNOSIS — R23.8 OTHER SKIN CHANGES: ICD-10-CM

## 2024-01-04 PROCEDURE — ? INVENTORY

## 2024-01-04 PROCEDURE — ? SCULPTRA

## 2024-01-04 NOTE — PROCEDURE: SCULPTRA
Show Chin Volume?: Yes
Right Tear Trough Filler Volume In Cc: 0
Show Right And Left Pa Volume?: No
Map Statement: See Attached Map for Complete Details.
Topical Anesthesia?: 20% lidocaine
Additional Area 1 Location: I/O, I/O sulcus, ZM cheeks, MLFs, pillars, anterior mandibles, and mentolabial
Additional Area 1 Volume In Cc: 16
Dilution Method: The Sculptra was diluted with 6mL of bacteriostatic water and 3mL of 1% Lidocaine with epinephrine 1:100,000 for a total volume of 9mL for each vial.
Volumizer: Sculptra
Detail Level: Detailed
Post-Care Instructions: Patient instructed to apply ice to reduce swelling.
Anesthesia Type: 1% lidocaine with epinephrine
Vials Reconstituted (Required For Inventory): 1
Injection Technique: The Sculptra was injected to the listed areas after cleansing the skin and providing appropriate anesthesia.
Reconstitution Date: 12-8-23
Show Inventory Tab: Hide
Consent: Written consent obtained. Risks include but not limited to bruising, beading, irregular texture, ulceration, infection, allergic reaction, scar formation, incomplete augmentation, temporary nature, procedural pain.

## 2024-01-30 ENCOUNTER — APPOINTMENT (RX ONLY)
Dept: URBAN - METROPOLITAN AREA CLINIC 76 | Facility: CLINIC | Age: 49
Setting detail: DERMATOLOGY
End: 2024-01-30

## 2024-01-30 DIAGNOSIS — D492 NEOPLASM OF UNSPECIFIED NATURE OF BONE, SOFT TISSUE, AND SKIN: ICD-10-CM

## 2024-01-30 DIAGNOSIS — L98.8 OTHER SPECIFIED DISORDERS OF THE SKIN AND SUBCUTANEOUS TISSUE: ICD-10-CM

## 2024-01-30 PROBLEM — R22.9 LOCALIZED SWELLING, MASS AND LUMP, UNSPECIFIED: Status: ACTIVE | Noted: 2024-01-30

## 2024-01-30 PROBLEM — D23.9 OTHER BENIGN NEOPLASM OF SKIN, UNSPECIFIED: Status: ACTIVE | Noted: 2024-01-30

## 2024-01-30 PROCEDURE — ? OBSERVATION AND MEASURE

## 2024-01-30 PROCEDURE — ? INVENTORY

## 2024-01-30 PROCEDURE — ? BOTOX

## 2024-01-30 PROCEDURE — 99212 OFFICE O/P EST SF 10 MIN: CPT

## 2024-01-30 PROCEDURE — ? ADDITIONAL NOTES

## 2024-01-30 ASSESSMENT — LOCATION DETAILED DESCRIPTION DERM: LOCATION DETAILED: LEFT MEDIAL ZYGOMA

## 2024-01-30 ASSESSMENT — LOCATION SIMPLE DESCRIPTION DERM: LOCATION SIMPLE: LEFT ZYGOMA

## 2024-01-30 ASSESSMENT — LOCATION ZONE DERM: LOCATION ZONE: FACE

## 2024-01-30 NOTE — PROCEDURE: BOTOX
Consent: Written consent obtained. Risks include but not limited to lid/brow ptosis, bruising, swelling, diplopia, temporary effect, incomplete chemical denervation. Patient instructed to not lie down for 4 hours and limit physical activity for 24 hours
Show Periorbital Units: Yes
Additional Area 1 Units: 6
Additional Area 1 Location: lateral cor
L Brow Units: 0
Show Ucl Units: No
Additional Area 2 Location: melissa
Post-Care Instructions: Patient instructed to not lie down for 4 hours and limit physical activity for 24 hours.
Additional Area 2 Units: 14
Detail Level: Detailed
Glabellar Complex Units: 9
Additional Area 3 Location: upper lip
Dilution (U/0.1 Cc): 10
Reconstitution Date (Optional): 1-31-24
Additional Area 3 Units: 5
Additional Area 4 Location: tails
Show Inventory Tab: Hide
Forehead Units: 2
Incrementing Botox Units: By 0.5 Units

## 2024-01-30 NOTE — PROCEDURE: ADDITIONAL NOTES
Additional Notes: 7x4mm skin colored non tender nodule that is subtlety linear in shape and located on the left lateral orbital rim. See previous notes. Nodule appeared April 2022 5-6 month after receiving sculptra at an outside office. Nodule has not responded to subcision and ILK. It gets better for a short period of time and then returns. Nodule is asymptomatic with no signs of inflammation. The nodule is visible. Patient raises the question, could it be something else. Baseline history most likely diagnosis of sculptra nodule that she would like to know what to do about. Discussed with patient that we do not have a previously recorded size. She knows that a biopsy is the only way to know exactly what it is, and is aware it would leave a scar. Discussed scheduling patient for an excision.
Detail Level: Simple
Render Risk Assessment In Note?: no
Additional Notes: Left upper eyelid-she has had history of a small asymptomatic whitish papule that she has had and asked about today. On exam she has a .5mm yellowish white follicular papule over the left upper eyelid. She has several visible sebaceous glands in the same general vicinity.  Dx: early milia vs. large sebaceous gland.\\n Advised patient to wait until it gets bigger and more established before we try to I&D it.

## 2024-01-30 NOTE — PROCEDURE: OBSERVATION
Body Location Override (Optional - Billing Will Still Be Based On Selected Body Map Location If Applicable): left lateral orbital rim 7x4mm
Detail Level: Detailed
Size Of Lesion: 7x4mm

## 2024-05-07 ENCOUNTER — APPOINTMENT (RX ONLY)
Dept: URBAN - METROPOLITAN AREA CLINIC 76 | Facility: CLINIC | Age: 49
Setting detail: DERMATOLOGY
End: 2024-05-07

## 2024-05-07 DIAGNOSIS — L98.8 OTHER SPECIFIED DISORDERS OF THE SKIN AND SUBCUTANEOUS TISSUE: ICD-10-CM

## 2024-05-07 PROCEDURE — ? BOTOX

## 2024-05-07 PROCEDURE — ? INVENTORY

## 2024-05-07 NOTE — PROCEDURE: BOTOX
Consent: Written consent obtained. Risks include but not limited to lid/brow ptosis, bruising, swelling, diplopia, temporary effect, incomplete chemical denervation. Patient instructed to not lie down for 4 hours and limit physical activity for 24 hours
Show Periorbital Units: Yes
Additional Area 1 Units: 6
Additional Area 1 Location: lateral cor
L Brow Units: 0
Show Ucl Units: No
Additional Area 2 Location: melissa
Post-Care Instructions: Patient instructed to not lie down for 4 hours and limit physical activity for 24 hours.
Additional Area 2 Units: 16
Detail Level: Detailed
Glabellar Complex Units: 9
Additional Area 3 Location: upper lip
Dilution (U/0.1 Cc): 10
Reconstitution Date (Optional): 5-6-24 & 5-7-24
Additional Area 3 Units: 5
Additional Area 4 Location: tails
Show Inventory Tab: Hide
Forehead Units: 2
Incrementing Botox Units: By 0.5 Units

## 2024-06-16 ENCOUNTER — RX ONLY (RX ONLY)
Age: 49
End: 2024-06-16

## 2024-08-07 ENCOUNTER — RX ONLY (OUTPATIENT)
Age: 49
Setting detail: RX ONLY
End: 2024-08-07

## 2024-08-19 ENCOUNTER — APPOINTMENT (RX ONLY)
Dept: URBAN - METROPOLITAN AREA CLINIC 76 | Facility: CLINIC | Age: 49
Setting detail: DERMATOLOGY
End: 2024-08-19

## 2024-08-19 DIAGNOSIS — R23.8 OTHER SKIN CHANGES: ICD-10-CM

## 2024-08-19 PROCEDURE — ? ADDITIONAL NOTES

## 2024-08-19 PROCEDURE — ? INVENTORY

## 2024-08-19 PROCEDURE — ? SCULPTRA

## 2024-08-19 NOTE — PROCEDURE: ADDITIONAL NOTES
Additional Notes: Discussed  between Sculptra appointments.
Render Risk Assessment In Note?: no
Detail Level: Simple
Additional Notes: LOT NUMBER [id number]

## 2024-08-19 NOTE — PROCEDURE: SCULPTRA
Show Chin Volume?: Yes
Right Tear Trough Filler Volume In Cc: 0
Show Right And Left Pa Volume?: No
Map Statement: See Attached Map for Complete Details.
Topical Anesthesia?: 20% lidocaine
Additional Area 1 Location: I/O, I/O sulcus, ZM cheeks, MLFs, pillars, anterior mandibles, and mentolabial
Additional Area 1 Volume In Cc: 7
Dilution Method: The Sculptra was diluted with 6mL of bacteriostatic water and 3mL of 1% Lidocaine with epinephrine 1:100,000 for a total volume of 9mL for each vial.
Volumizer: Sculptra
Detail Level: Detailed
Post-Care Instructions: Patient instructed to apply ice to reduce swelling.
Anesthesia Type: 1% lidocaine with epinephrine
Vials Reconstituted (Required For Inventory): 1
Injection Technique: The Sculptra was injected to the listed areas after cleansing the skin and providing appropriate anesthesia.
Reconstitution Date: 8-6-24, 8-16-24
Show Inventory Tab: Hide
Consent: Written consent obtained. Risks include but not limited to bruising, beading, irregular texture, ulceration, infection, allergic reaction, scar formation, incomplete augmentation, temporary nature, procedural pain.

## 2024-09-16 ENCOUNTER — APPOINTMENT (RX ONLY)
Dept: URBAN - METROPOLITAN AREA CLINIC 76 | Facility: CLINIC | Age: 49
Setting detail: DERMATOLOGY
End: 2024-09-16

## 2024-09-16 DIAGNOSIS — L98.8 OTHER SPECIFIED DISORDERS OF THE SKIN AND SUBCUTANEOUS TISSUE: ICD-10-CM

## 2024-09-16 PROCEDURE — ? INVENTORY

## 2024-09-16 PROCEDURE — ? BOTOX

## 2024-09-16 NOTE — PROCEDURE: BOTOX
Consent: Written consent obtained. Risks include but not limited to lid/brow ptosis, bruising, swelling, diplopia, temporary effect, incomplete chemical denervation. Patient instructed to not lie down for 4 hours and limit physical activity for 24 hours
Show Periorbital Units: Yes
Additional Area 1 Units: 6
Additional Area 1 Location: lateral cor
L Brow Units: 0
Show Ucl Units: No
Additional Area 2 Location: melissa
Post-Care Instructions: Patient instructed to not lie down for 4 hours and limit physical activity for 24 hours.
Additional Area 2 Units: 16
Detail Level: Detailed
Glabellar Complex Units: 9
Additional Area 3 Location: upper lip
Dilution (U/0.1 Cc): 10
Reconstitution Date (Optional): 9/16/24
Additional Area 3 Units: 5
Additional Area 4 Location: tails
Show Inventory Tab: Hide
Forehead Units: 2
Incrementing Botox Units: By 0.5 Units

## 2024-11-11 ENCOUNTER — APPOINTMENT (RX ONLY)
Dept: URBAN - METROPOLITAN AREA CLINIC 76 | Facility: CLINIC | Age: 49
Setting detail: DERMATOLOGY
End: 2024-11-11

## 2024-11-11 DIAGNOSIS — R23.8 OTHER SKIN CHANGES: ICD-10-CM

## 2024-11-11 DIAGNOSIS — L91.8 OTHER HYPERTROPHIC DISORDERS OF THE SKIN: ICD-10-CM

## 2024-11-11 PROCEDURE — ? INVENTORY

## 2024-11-11 PROCEDURE — ? SKIN TAG REMOVAL (COSMETIC)

## 2024-11-11 PROCEDURE — ? JUVEDERM VOLUMA XC INJECTION

## 2024-11-11 ASSESSMENT — LOCATION ZONE DERM: LOCATION ZONE: FACE

## 2024-11-11 ASSESSMENT — LOCATION SIMPLE DESCRIPTION DERM: LOCATION SIMPLE: LEFT EYEBROW

## 2024-11-11 ASSESSMENT — LOCATION DETAILED DESCRIPTION DERM: LOCATION DETAILED: LEFT CENTRAL EYEBROW

## 2024-11-11 NOTE — PROCEDURE: SKIN TAG REMOVAL (COSMETIC)
Anesthesia Type: 1% lidocaine with epinephrine
Anesthesia Volume In Cc: 0.1
Hemostasis: Pressure
Consent: Written consent obtained and the risks of skin tag removal was reviewed with the patient including but not limited to bleeding, pigmentary change, infection, pain, and remote possibility of scarring.
Removed With: scissors
Detail Level: Detailed

## 2024-11-11 NOTE — PROCEDURE: JUVEDERM VOLUMA XC INJECTION
Nasolabial Folds Filler Volume In Cc: 0
Number Of Syringes (Required For Inventory): 1
Map Statment: See Attach Map for Complete Details
Show Inventory Tab: Hide
Topical Anesthesia?: 20% lidocaine
Consent: Written consent obtained. Risks include but not limited to bruising, beading, irregular texture, ulceration, infection, allergic reaction, scar formation, incomplete augmentation, temporary nature, procedural pain.
Procedural Text: The filler was administered to the treatment areas noted above.
Include Cannula Information In Note?: No
Detail Level: Detailed
Filler: Juvederm Voluma XC
Post-Care Instructions: Patient instructed to apply ice to reduce swelling.

## 2024-12-11 ENCOUNTER — APPOINTMENT (OUTPATIENT)
Dept: URBAN - METROPOLITAN AREA CLINIC 76 | Facility: CLINIC | Age: 49
Setting detail: DERMATOLOGY
End: 2024-12-11

## 2024-12-11 DIAGNOSIS — L98.8 OTHER SPECIFIED DISORDERS OF THE SKIN AND SUBCUTANEOUS TISSUE: ICD-10-CM

## 2024-12-11 PROCEDURE — ? COUNSELING

## 2024-12-11 PROCEDURE — ? INVENTORY

## 2024-12-11 PROCEDURE — ? BOTOX

## 2024-12-11 NOTE — PROCEDURE: BOTOX
Consent: Written consent obtained. Risks include but not limited to lid/brow ptosis, bruising, swelling, diplopia, temporary effect, incomplete chemical denervation. Patient instructed to not lie down for 4 hours and limit physical activity for 24 hours
Show Periorbital Units: Yes
Additional Area 1 Units: 6
Additional Area 1 Location: lateral cor
L Brow Units: 0
Show Ucl Units: No
Additional Area 2 Location: melissa
Post-Care Instructions: Patient instructed to not lie down for 4 hours and limit physical activity for 24 hours.
Additional Area 2 Units: 16
Detail Level: Detailed
Glabellar Complex Units: 9
Additional Area 3 Location: upper lip
Dilution (U/0.1 Cc): 10
Reconstitution Date (Optional): 12/11/24
Additional Area 3 Units: 5
Additional Area 4 Location: tails
Show Inventory Tab: Hide
Forehead Units: 2
Additional Area 4 Units: 8
Incrementing Botox Units: By 0.5 Units

## 2025-01-16 ENCOUNTER — APPOINTMENT (OUTPATIENT)
Dept: URBAN - METROPOLITAN AREA CLINIC 76 | Facility: CLINIC | Age: 50
Setting detail: DERMATOLOGY
End: 2025-01-16

## 2025-01-16 DIAGNOSIS — R23.8 OTHER SKIN CHANGES: ICD-10-CM

## 2025-01-16 PROCEDURE — ? INVENTORY

## 2025-01-16 PROCEDURE — ? JUVEDERM VOLUMA XC INJECTION

## 2025-01-16 NOTE — PROCEDURE: JUVEDERM VOLUMA XC INJECTION
Nasolabial Folds Filler Volume In Cc: 0
Number Of Syringes (Required For Inventory): 1
Map Statment: See Attach Map for Complete Details
Show Inventory Tab: Hide
Topical Anesthesia?: 20% lidocaine
Consent: Written consent obtained. Risks include but not limited to bruising, beading, irregular texture, ulceration, infection, allergic reaction, scar formation, incomplete augmentation, temporary nature, procedural pain.
Cheeks Filler Volume In Cc: 2
Procedural Text: The filler was administered to the treatment areas noted above.
Include Cannula Information In Note?: No
Detail Level: Detailed
Filler: Juvederm Voluma XC
Post-Care Instructions: Patient instructed to apply ice to reduce swelling.

## 2025-03-17 ENCOUNTER — APPOINTMENT (OUTPATIENT)
Dept: URBAN - METROPOLITAN AREA CLINIC 76 | Facility: CLINIC | Age: 50
Setting detail: DERMATOLOGY
End: 2025-03-17

## 2025-03-17 DIAGNOSIS — L98.8 OTHER SPECIFIED DISORDERS OF THE SKIN AND SUBCUTANEOUS TISSUE: ICD-10-CM

## 2025-03-17 PROCEDURE — ? COUNSELING

## 2025-03-17 PROCEDURE — ? BOTOX

## 2025-03-17 PROCEDURE — ? INVENTORY

## 2025-03-17 NOTE — PROCEDURE: BOTOX
Consent: Written consent obtained. Risks include but not limited to lid/brow ptosis, bruising, swelling, diplopia, temporary effect, incomplete chemical denervation. Patient instructed to not lie down for 4 hours and limit physical activity for 24 hours
Show Periorbital Units: Yes
Additional Area 1 Units: 6
Additional Area 1 Location: lateral cor
L Brow Units: 0
Show Ucl Units: No
Additional Area 2 Location: melissa
Post-Care Instructions: Patient instructed to not lie down for 4 hours and limit physical activity for 24 hours.
Additional Area 2 Units: 16
Detail Level: Detailed
Glabellar Complex Units: 9
Additional Area 3 Location: upper lip
Dilution (U/0.1 Cc): 10
Reconstitution Date (Optional): 3/17/25
Additional Area 3 Units: 5
Additional Area 4 Location: tails
Show Inventory Tab: Hide
Forehead Units: 2
Additional Area 4 Units: 8
Incrementing Botox Units: By 0.5 Units

## 2025-05-22 ENCOUNTER — APPOINTMENT (OUTPATIENT)
Dept: URBAN - METROPOLITAN AREA CLINIC 76 | Facility: CLINIC | Age: 50
Setting detail: DERMATOLOGY
End: 2025-05-22

## 2025-05-22 DIAGNOSIS — R23.8 OTHER SKIN CHANGES: ICD-10-CM

## 2025-05-22 PROCEDURE — ? INVENTORY

## 2025-05-22 PROCEDURE — ? JUVEDERM VOLUMA XC INJECTION

## 2025-06-19 ENCOUNTER — APPOINTMENT (OUTPATIENT)
Dept: URBAN - METROPOLITAN AREA CLINIC 76 | Facility: CLINIC | Age: 50
Setting detail: DERMATOLOGY
End: 2025-06-19

## 2025-06-19 DIAGNOSIS — L98.8 OTHER SPECIFIED DISORDERS OF THE SKIN AND SUBCUTANEOUS TISSUE: ICD-10-CM

## 2025-06-19 PROCEDURE — ? BOTOX

## 2025-06-19 PROCEDURE — ? INVENTORY

## 2025-06-19 PROCEDURE — ? COUNSELING

## 2025-06-19 NOTE — PROCEDURE: BOTOX
Consent: Written consent obtained. Risks include but not limited to lid/brow ptosis, bruising, swelling, diplopia, temporary effect, incomplete chemical denervation. Patient instructed to not lie down for 4 hours and limit physical activity for 24 hours
Show Periorbital Units: Yes
Additional Area 1 Units: 6
Additional Area 1 Location: lateral cor
L Brow Units: 0
Show Ucl Units: No
Additional Area 2 Location: melissa
Post-Care Instructions: Patient instructed to not lie down for 4 hours and limit physical activity for 24 hours.
Additional Area 2 Units: 22
Detail Level: Detailed
Glabellar Complex Units: 9
Additional Area 3 Location: upper lip
Dilution (U/0.1 Cc): 10
Reconstitution Date (Optional): 6/19/25
Additional Area 3 Units: 5
Additional Area 4 Location: tails
Show Inventory Tab: Hide
Forehead Units: 2
Additional Area 4 Units: 8
Incrementing Botox Units: By 0.5 Units